# Patient Record
Sex: FEMALE | Race: WHITE | NOT HISPANIC OR LATINO | URBAN - METROPOLITAN AREA
[De-identification: names, ages, dates, MRNs, and addresses within clinical notes are randomized per-mention and may not be internally consistent; named-entity substitution may affect disease eponyms.]

---

## 2021-07-20 ENCOUNTER — OUTPATIENT (OUTPATIENT)
Dept: OUTPATIENT SERVICES | Facility: HOSPITAL | Age: 41
LOS: 1 days | End: 2021-07-20
Payer: COMMERCIAL

## 2021-07-20 DIAGNOSIS — Z11.52 ENCOUNTER FOR SCREENING FOR COVID-19: ICD-10-CM

## 2021-07-20 LAB — SARS-COV-2 RNA SPEC QL NAA+PROBE: SIGNIFICANT CHANGE UP

## 2021-07-20 PROCEDURE — U0003: CPT

## 2021-07-20 PROCEDURE — C9803: CPT

## 2021-07-20 PROCEDURE — U0005: CPT

## 2021-07-22 ENCOUNTER — INPATIENT (INPATIENT)
Facility: HOSPITAL | Age: 41
LOS: 0 days | Discharge: HOME | End: 2021-07-23
Attending: STUDENT IN AN ORGANIZED HEALTH CARE EDUCATION/TRAINING PROGRAM | Admitting: STUDENT IN AN ORGANIZED HEALTH CARE EDUCATION/TRAINING PROGRAM
Payer: COMMERCIAL

## 2021-07-22 VITALS
TEMPERATURE: 98 F | HEART RATE: 90 BPM | RESPIRATION RATE: 16 BRPM | DIASTOLIC BLOOD PRESSURE: 62 MMHG | SYSTOLIC BLOOD PRESSURE: 110 MMHG

## 2021-07-22 DIAGNOSIS — O09.523 SUPERVISION OF ELDERLY MULTIGRAVIDA, THIRD TRIMESTER: ICD-10-CM

## 2021-07-22 DIAGNOSIS — O40.3XX0 POLYHYDRAMNIOS, THIRD TRIMESTER, NOT APPLICABLE OR UNSPECIFIED: ICD-10-CM

## 2021-07-22 LAB
BASOPHILS # BLD AUTO: 0.06 K/UL — SIGNIFICANT CHANGE UP (ref 0–0.2)
BASOPHILS NFR BLD AUTO: 0.6 % — SIGNIFICANT CHANGE UP (ref 0–2)
BLD GP AB SCN SERPL QL: NEGATIVE — SIGNIFICANT CHANGE UP
COVID-19 SPIKE DOMAIN AB INTERP: POSITIVE
COVID-19 SPIKE DOMAIN ANTIBODY RESULT: >250 U/ML — HIGH
EOSINOPHIL # BLD AUTO: 0.11 K/UL — SIGNIFICANT CHANGE UP (ref 0–0.5)
EOSINOPHIL NFR BLD AUTO: 1 % — SIGNIFICANT CHANGE UP (ref 0–6)
HCT VFR BLD CALC: 40.6 % — SIGNIFICANT CHANGE UP (ref 34.5–45)
HGB BLD-MCNC: 13.6 G/DL — SIGNIFICANT CHANGE UP (ref 11.5–15.5)
IMM GRANULOCYTES NFR BLD AUTO: 1.9 % — HIGH (ref 0–1.5)
LYMPHOCYTES # BLD AUTO: 1.91 K/UL — SIGNIFICANT CHANGE UP (ref 1–3.3)
LYMPHOCYTES # BLD AUTO: 17.7 % — SIGNIFICANT CHANGE UP (ref 13–44)
MCHC RBC-ENTMCNC: 30.8 PG — SIGNIFICANT CHANGE UP (ref 27–34)
MCHC RBC-ENTMCNC: 33.5 GM/DL — SIGNIFICANT CHANGE UP (ref 32–36)
MCV RBC AUTO: 92.1 FL — SIGNIFICANT CHANGE UP (ref 80–100)
MONOCYTES # BLD AUTO: 0.6 K/UL — SIGNIFICANT CHANGE UP (ref 0–0.9)
MONOCYTES NFR BLD AUTO: 5.6 % — SIGNIFICANT CHANGE UP (ref 2–14)
NEUTROPHILS # BLD AUTO: 7.91 K/UL — HIGH (ref 1.8–7.4)
NEUTROPHILS NFR BLD AUTO: 73.2 % — SIGNIFICANT CHANGE UP (ref 43–77)
NRBC # BLD: 0 /100 WBCS — SIGNIFICANT CHANGE UP (ref 0–0)
PLATELET # BLD AUTO: 191 K/UL — SIGNIFICANT CHANGE UP (ref 150–400)
RBC # BLD: 4.41 M/UL — SIGNIFICANT CHANGE UP (ref 3.8–5.2)
RBC # FLD: 13.6 % — SIGNIFICANT CHANGE UP (ref 10.3–14.5)
RH IG SCN BLD-IMP: POSITIVE — SIGNIFICANT CHANGE UP
SARS-COV-2 IGG+IGM SERPL QL IA: >250 U/ML — HIGH
SARS-COV-2 IGG+IGM SERPL QL IA: POSITIVE
T PALLIDUM AB TITR SER: NEGATIVE — SIGNIFICANT CHANGE UP
WBC # BLD: 10.79 K/UL — HIGH (ref 3.8–10.5)
WBC # FLD AUTO: 10.79 K/UL — HIGH (ref 3.8–10.5)

## 2021-07-22 RX ORDER — OXYTOCIN 10 UNIT/ML
4 VIAL (ML) INJECTION
Qty: 30 | Refills: 0 | Status: DISCONTINUED | OUTPATIENT
Start: 2021-07-22 | End: 2021-07-22

## 2021-07-22 RX ORDER — PRAMOXINE HYDROCHLORIDE 150 MG/15G
1 AEROSOL, FOAM RECTAL EVERY 4 HOURS
Refills: 0 | Status: DISCONTINUED | OUTPATIENT
Start: 2021-07-22 | End: 2021-07-23

## 2021-07-22 RX ORDER — IBUPROFEN 200 MG
600 TABLET ORAL EVERY 6 HOURS
Refills: 0 | Status: COMPLETED | OUTPATIENT
Start: 2021-07-22 | End: 2022-06-20

## 2021-07-22 RX ORDER — OXYCODONE HYDROCHLORIDE 5 MG/1
5 TABLET ORAL
Refills: 0 | Status: DISCONTINUED | OUTPATIENT
Start: 2021-07-22 | End: 2021-07-23

## 2021-07-22 RX ORDER — DIPHENHYDRAMINE HCL 50 MG
25 CAPSULE ORAL EVERY 6 HOURS
Refills: 0 | Status: DISCONTINUED | OUTPATIENT
Start: 2021-07-22 | End: 2021-07-23

## 2021-07-22 RX ORDER — LANOLIN
1 OINTMENT (GRAM) TOPICAL EVERY 6 HOURS
Refills: 0 | Status: DISCONTINUED | OUTPATIENT
Start: 2021-07-22 | End: 2021-07-23

## 2021-07-22 RX ORDER — IBUPROFEN 200 MG
600 TABLET ORAL EVERY 6 HOURS
Refills: 0 | Status: DISCONTINUED | OUTPATIENT
Start: 2021-07-22 | End: 2021-07-23

## 2021-07-22 RX ORDER — SODIUM CHLORIDE 9 MG/ML
1000 INJECTION, SOLUTION INTRAVENOUS
Refills: 0 | Status: DISCONTINUED | OUTPATIENT
Start: 2021-07-22 | End: 2021-07-22

## 2021-07-22 RX ORDER — TETANUS TOXOID, REDUCED DIPHTHERIA TOXOID AND ACELLULAR PERTUSSIS VACCINE, ADSORBED 5; 2.5; 8; 8; 2.5 [IU]/.5ML; [IU]/.5ML; UG/.5ML; UG/.5ML; UG/.5ML
0.5 SUSPENSION INTRAMUSCULAR ONCE
Refills: 0 | Status: DISCONTINUED | OUTPATIENT
Start: 2021-07-22 | End: 2021-07-23

## 2021-07-22 RX ORDER — OXYCODONE HYDROCHLORIDE 5 MG/1
5 TABLET ORAL ONCE
Refills: 0 | Status: DISCONTINUED | OUTPATIENT
Start: 2021-07-22 | End: 2021-07-23

## 2021-07-22 RX ORDER — SODIUM CHLORIDE 9 MG/ML
3 INJECTION INTRAMUSCULAR; INTRAVENOUS; SUBCUTANEOUS EVERY 8 HOURS
Refills: 0 | Status: DISCONTINUED | OUTPATIENT
Start: 2021-07-22 | End: 2021-07-23

## 2021-07-22 RX ORDER — HYDROCORTISONE 1 %
1 OINTMENT (GRAM) TOPICAL EVERY 6 HOURS
Refills: 0 | Status: DISCONTINUED | OUTPATIENT
Start: 2021-07-22 | End: 2021-07-23

## 2021-07-22 RX ORDER — SIMETHICONE 80 MG/1
80 TABLET, CHEWABLE ORAL EVERY 4 HOURS
Refills: 0 | Status: DISCONTINUED | OUTPATIENT
Start: 2021-07-22 | End: 2021-07-23

## 2021-07-22 RX ORDER — CITRIC ACID/SODIUM CITRATE 300-500 MG
15 SOLUTION, ORAL ORAL EVERY 6 HOURS
Refills: 0 | Status: DISCONTINUED | OUTPATIENT
Start: 2021-07-22 | End: 2021-07-22

## 2021-07-22 RX ORDER — ACETAMINOPHEN 500 MG
975 TABLET ORAL
Refills: 0 | Status: DISCONTINUED | OUTPATIENT
Start: 2021-07-22 | End: 2021-07-23

## 2021-07-22 RX ORDER — DIBUCAINE 1 %
1 OINTMENT (GRAM) RECTAL EVERY 6 HOURS
Refills: 0 | Status: DISCONTINUED | OUTPATIENT
Start: 2021-07-22 | End: 2021-07-23

## 2021-07-22 RX ORDER — OXYTOCIN 10 UNIT/ML
VIAL (ML) INJECTION
Qty: 30 | Refills: 0 | Status: DISCONTINUED | OUTPATIENT
Start: 2021-07-22 | End: 2021-07-22

## 2021-07-22 RX ORDER — BENZOCAINE 10 %
1 GEL (GRAM) MUCOUS MEMBRANE EVERY 6 HOURS
Refills: 0 | Status: DISCONTINUED | OUTPATIENT
Start: 2021-07-22 | End: 2021-07-23

## 2021-07-22 RX ORDER — AER TRAVELER 0.5 G/1
1 SOLUTION RECTAL; TOPICAL EVERY 4 HOURS
Refills: 0 | Status: DISCONTINUED | OUTPATIENT
Start: 2021-07-22 | End: 2021-07-23

## 2021-07-22 RX ORDER — OXYTOCIN 10 UNIT/ML
333.33 VIAL (ML) INJECTION
Qty: 20 | Refills: 0 | Status: COMPLETED | OUTPATIENT
Start: 2021-07-22 | End: 2021-07-22

## 2021-07-22 RX ORDER — MAGNESIUM HYDROXIDE 400 MG/1
30 TABLET, CHEWABLE ORAL
Refills: 0 | Status: DISCONTINUED | OUTPATIENT
Start: 2021-07-22 | End: 2021-07-23

## 2021-07-22 RX ORDER — OXYTOCIN 10 UNIT/ML
333.33 VIAL (ML) INJECTION
Qty: 20 | Refills: 0 | Status: DISCONTINUED | OUTPATIENT
Start: 2021-07-22 | End: 2021-07-23

## 2021-07-22 RX ORDER — KETOROLAC TROMETHAMINE 30 MG/ML
30 SYRINGE (ML) INJECTION ONCE
Refills: 0 | Status: DISCONTINUED | OUTPATIENT
Start: 2021-07-22 | End: 2021-07-22

## 2021-07-22 RX ADMIN — SODIUM CHLORIDE 125 MILLILITER(S): 9 INJECTION, SOLUTION INTRAVENOUS at 06:12

## 2021-07-22 RX ADMIN — Medication 1000 MILLIUNIT(S)/MIN: at 17:45

## 2021-07-22 RX ADMIN — Medication 1000 MILLIUNIT(S)/MIN: at 14:10

## 2021-07-22 RX ADMIN — Medication 600 MILLIGRAM(S): at 21:18

## 2021-07-22 RX ADMIN — Medication 15 MILLILITER(S): at 07:23

## 2021-07-22 RX ADMIN — Medication 4 MILLIUNIT(S)/MIN: at 07:43

## 2021-07-22 RX ADMIN — Medication 30 MILLIGRAM(S): at 15:12

## 2021-07-22 NOTE — OB PROVIDER LABOR PROGRESS NOTE - ASSESSMENT
IOL for AMA, poly.  -c/w pit for augmentation  -efm/toco  -epi for pain control  -peanut ball francisca Verdugo MD

## 2021-07-22 NOTE — PRE-ANESTHESIA EVALUATION ADULT - NSANTHOSAYNRD_GEN_A_CORE
No. LANEY screening performed.  STOP BANG Legend: 0-2 = LOW Risk; 3-4 = INTERMEDIATE Risk; 5-8 = HIGH Risk

## 2021-07-22 NOTE — OB RN DELIVERY SUMMARY - NS_SEPSISRSKCALC_OBGYN_ALL_OB_FT
EOS calculated successfully. EOS Risk Factor: 0.5/1000 live births ([facility] national incidence); GA=39w3d; Temp=98.24; ROM=3.833; GBS='Negative'; Antibiotics='No antibiotics or any antibiotics < 2 hrs prior to birth'

## 2021-07-22 NOTE — OB PROVIDER DELIVERY SUMMARY - NSPROVIDERDELIVERYNOTE_OBGYN_ALL_OB_FT
of liveborn female infant in ALANA position. Head, shoulders, body delivered easily. Terminal mec. Delayed cord clamping performed. Cord clamped and cut. Private cord blood collection. Placenta delivered spontaneously and intact. Vaginal exam revealed 2nd degree perineal laceration, repaired with 2.0 and 3.0 vicryl rapide. Excellent hemostasis noted. Count correct x 2.

## 2021-07-22 NOTE — OB PROVIDER DELIVERY SUMMARY - NSSELHIDDEN_OBGYN_ALL_OB_FT
[NS_DeliveryAttending1_OBGYN_ALL_OB_FT:[id number]=],[NS_DeliveryRN_OBGYN_ALL_OB_FT:[id number]=]

## 2021-07-22 NOTE — PRE-ANESTHESIA EVALUATION ADULT - ANESTHESIA, PREVIOUS REACTION, PROFILE
Please check with pharmacy to see what the cost of adderall XR 10 mg daily would be.  Electronically signed by Vikki Toro M.D.    none

## 2021-07-22 NOTE — OB RN DELIVERY SUMMARY - NSSELHIDDEN_OBGYN_ALL_OB_FT
[NS_DeliveryAttending1_OBGYN_ALL_OB_FT:[id number]=],[NS_DeliveryRN_OBGYN_ALL_OB_FT:[id number]=]

## 2021-07-22 NOTE — OB PROVIDER H&P - HISTORY OF PRESENT ILLNESS
40y  @39wks and 3 days gestation presenting for scheduled IOL for polyhydramnios. Patient admits to irregular cramping. She denies LOF or VB. PNC c/b polyhydramnios (pt unsure of exact value) and AMA. +FM. GBS -. EFW 8#13 done by ultrasound yesterday.    POBHx: 2016-, FT, 6#3  PGYNHx: Denies fibroids, ovarian cysts, abnormal pap smears, STD's  PMHx: Denies  Medications: PNV  Allergies: NKDA  PSHx: Denies  Social Hx: Denies etoh/tobacco/drug use. Denies anxiety/depression/pp depression    Vital Signs Last 24 Hrs  T(C): 36.8 (2021 05:01), Max: 36.8 (2021 04:52)  T(F): 98.2 (2021 05:01), Max: 98.24 (2021 04:52)  HR: 97 (2021 05:23) (90 - 97)  BP: 110/62 (2021 05:01) (110/62 - 110/62)  BP(mean): --  RR: 16 (2021 05:01) (16 - 16)  SpO2: 97% (2021 05:23) (96% - 97%)    General: NAD, A&Ox3  CV: RRR  Lungs: CTA b/l  Abdomen: Soft, NT, gravid    VE: 3.5/70/-3  Bedside sono: Vertex presentation  EFM: 130/moderate variability/+accels/no decels  [place]: No ctx

## 2021-07-22 NOTE — OB PROVIDER H&P - NSHPPHYSICALEXAM_GEN_ALL_CORE
Vital Signs Last 24 Hrs  T(C): 36.8 (22 Jul 2021 05:01), Max: 36.8 (22 Jul 2021 04:52)  T(F): 98.2 (22 Jul 2021 05:01), Max: 98.24 (22 Jul 2021 04:52)  HR: 97 (22 Jul 2021 05:23) (90 - 97)  BP: 110/62 (22 Jul 2021 05:01) (110/62 - 110/62)  BP(mean): --  RR: 16 (22 Jul 2021 05:01) (16 - 16)  SpO2: 97% (22 Jul 2021 05:23) (96% - 97%)    General: NAD, A&Ox3  CV: RRR  Lungs: CTA b/l  Abdomen: Soft, NT, gravid

## 2021-07-22 NOTE — OB PROVIDER H&P - ASSESSMENT
A/P:  40y  @39wks and 3 days gestation presenting for scheduled IOL for polyhydramnios. +FM. GBS -. EFW 4000g  -Admit to L&D  -Routine labs  -EFM/[place]  -NPO, IVF, Bicitra  -IOL with vaginal cytotec  -Anesthesia consult  -Anticipate   Plan per induction schedule  Saba Leach PA-C

## 2021-07-23 ENCOUNTER — TRANSCRIPTION ENCOUNTER (OUTPATIENT)
Age: 41
End: 2021-07-23

## 2021-07-23 VITALS
DIASTOLIC BLOOD PRESSURE: 72 MMHG | SYSTOLIC BLOOD PRESSURE: 120 MMHG | OXYGEN SATURATION: 96 % | RESPIRATION RATE: 18 BRPM | TEMPERATURE: 98 F | HEART RATE: 87 BPM

## 2021-07-23 PROCEDURE — 86780 TREPONEMA PALLIDUM: CPT

## 2021-07-23 PROCEDURE — 86769 SARS-COV-2 COVID-19 ANTIBODY: CPT

## 2021-07-23 PROCEDURE — 59050 FETAL MONITOR W/REPORT: CPT

## 2021-07-23 PROCEDURE — 86900 BLOOD TYPING SEROLOGIC ABO: CPT

## 2021-07-23 PROCEDURE — 86901 BLOOD TYPING SEROLOGIC RH(D): CPT

## 2021-07-23 PROCEDURE — 85025 COMPLETE CBC W/AUTO DIFF WBC: CPT

## 2021-07-23 PROCEDURE — 59025 FETAL NON-STRESS TEST: CPT

## 2021-07-23 PROCEDURE — 86850 RBC ANTIBODY SCREEN: CPT

## 2021-07-23 RX ORDER — ACETAMINOPHEN 500 MG
3 TABLET ORAL
Qty: 0 | Refills: 0 | DISCHARGE
Start: 2021-07-23

## 2021-07-23 RX ORDER — IBUPROFEN 200 MG
1 TABLET ORAL
Qty: 0 | Refills: 0 | DISCHARGE
Start: 2021-07-23

## 2021-07-23 RX ADMIN — Medication 600 MILLIGRAM(S): at 14:40

## 2021-07-23 RX ADMIN — Medication 975 MILLIGRAM(S): at 11:53

## 2021-07-23 RX ADMIN — Medication 975 MILLIGRAM(S): at 12:25

## 2021-07-23 RX ADMIN — Medication 975 MILLIGRAM(S): at 00:10

## 2021-07-23 RX ADMIN — Medication 600 MILLIGRAM(S): at 02:41

## 2021-07-23 RX ADMIN — Medication 600 MILLIGRAM(S): at 15:03

## 2021-07-23 RX ADMIN — Medication 600 MILLIGRAM(S): at 09:12

## 2021-07-23 RX ADMIN — Medication 1 TABLET(S): at 11:53

## 2021-07-23 RX ADMIN — Medication 600 MILLIGRAM(S): at 09:45

## 2021-07-23 RX ADMIN — Medication 975 MILLIGRAM(S): at 05:56

## 2021-07-23 NOTE — DISCHARGE NOTE OB - PATIENT PORTAL LINK FT
You can access the FollowMyHealth Patient Portal offered by [facility] by registering at the following website: [email or web address]. By joining [employer]’s FollowMyHealth portal, you will also be able to view your health information using other applications (apps) compatible with our system.

## 2021-07-23 NOTE — DISCHARGE NOTE OB - CARE PLAN
Principal Discharge DX:	Normal spontaneous vaginal delivery  Goal:	Recovery  Assessment and plan of treatment:	Make a follow-up appointment with your doctor in SIX WEEKS for a postpartum appointment. No heavy lifting or strenuous activity for six weeks. Nothing in the vagina (such as tampons, douches, intercourse or tub baths) until you see your doctor. You can take 1000mg of Tylenol and/or 600mg of Motrin every 6 hours for pain. Call your doctor with any signs and symptoms of infection such as fever, chills, nausea or vomiting; if you're unable to tolerate food, have an increase in vaginal bleeding, or have difficulty urinating; or if you have pain that is not relieved by medication. Notify your doctor with any other concerns including feeling depressed or "down".

## 2021-07-23 NOTE — PROGRESS NOTE ADULT - SUBJECTIVE AND OBJECTIVE BOX
OB Progress Note:  PPD#1    S: 39yo  now  PPD#1 s/p . Patient feels well. Pain is well controlled. She is tolerating a regular diet and passing flatus. She is voiding spontaneously, and ambulating without difficulty. Denies CP/SOB. Denies lightheadedness/dizziness. Denies N/V.    O:  Vitals:  Vital Signs Last 24 Hrs  T(C): 36.8 (2021 05:23), Max: 37.2 (2021 18:00)  T(F): 98.2 (2021 05:23), Max: 99 (2021 18:00)  HR: 84 (2021 05:23) (81 - 120)  BP: 92/60 (2021 05:23) (63/28 - 135/69)  BP(mean): --  RR: 18 (2021 05:23) (18 - 20)  SpO2: 96% (2021 05:23) (85% - 100%)    MEDICATIONS  (STANDING):  acetaminophen   Tablet .. 975 milliGRAM(s) Oral <User Schedule>  diphtheria/tetanus/pertussis (acellular) Vaccine (ADAcel) 0.5 milliLiter(s) IntraMuscular once  ibuprofen  Tablet. 600 milliGRAM(s) Oral every 6 hours  oxytocin Infusion 333.333 milliUNIT(s)/Min (1000 mL/Hr) IV Continuous <Continuous>  prenatal multivitamin 1 Tablet(s) Oral daily  sodium chloride 0.9% lock flush 3 milliLiter(s) IV Push every 8 hours      Labs:  Blood type: A Positive  Rubella IgG: RPR: Negative                          13.6   10.79<H> >-----------< 191    ( 22 @ 06:18 )             40.6                  Physical Exam:  General: NAD  Abdomen: soft, non-tender, non-distended, fundus firm  Vaginal: Lochia wnl  Extremities: No erythema/edema

## 2021-07-23 NOTE — DISCHARGE NOTE OB - MATERIALS PROVIDED
[facility] [place] Screening Program/Breastfeeding Log/Breastfeeding Mother’s Support Group Information/Guide to Postpartum Care/[facility] Hearing Screen Program/Breastfeeding Guide and Packet/Birth Certificate Instructions

## 2021-07-23 NOTE — DISCHARGE NOTE OB - WEAR SUPPORTIVE BRA
Emergency Department  [address] [place] [address]  Phone:  [phone]  Fax:  [phone]                                    Phong Atkins   MRN: [id number]    Department:   Emergency Department   Date of Visit:  7/13/2019           After Visit Summary Signature Page    I have received my discharge instructions, and my questions have been answered. I have discussed any challenges I see with this plan with the nurse or doctor.    ..........................................................................................................................................  Patient/Patient Representative Signature      ..........................................................................................................................................  Patient Representative Print Name and Relationship to Patient    ..................................................               ................................................  Date                                   Time    ..........................................................................................................................................  Reviewed by Signature/Title    ...................................................              ..............................................  Date                                               Time          22EPIC Rev 08/18        Statement Selected

## 2021-07-23 NOTE — PROGRESS NOTE ADULT - ATTENDING COMMENTS
A/P: 41yo  PPD#1 s/p .   Patient is doing well postpartum.     #Pp care    - Pain well controlled, continue current pain regimen  - Increase ambulation, SCDs when not ambulating  - Continue regular diet  patient seen by me doing well  bonding with baby  for discharge tomorrow  eddier

## 2021-07-23 NOTE — DISCHARGE NOTE OB - MEDICATION SUMMARY - MEDICATIONS TO TAKE
I will START or STAY ON the medications listed below when I get home from the hospital:    acetaminophen 325 mg oral tablet  -- 3 tab(s) by mouth   -- Indication: For Pain control    ibuprofen 600 mg oral tablet  -- 1 tab(s) by mouth every 6 hours  -- Indication: For Pain control

## 2021-07-23 NOTE — DISCHARGE NOTE OB - CARE PROVIDER_API CALL
Jessica Wharton  RESIDENCY - OBSTETRIC-GYN  [address] [place] [address]  Phone: [phone]  Fax: [phone]  Follow Up Time:

## 2021-07-23 NOTE — PROGRESS NOTE ADULT - ASSESSMENT
A/P: 41yo  PPD#1 s/p .   Patient is doing well postpartum.     #Pp care    - Pain well controlled, continue current pain regimen  - Increase ambulation, SCDs when not ambulating  - Continue regular diet    Gabriella Gómez, PGY1   A/P: 39yo  PPD#1 s/p .   Patient is doing well postpartum.     #Pp care    - Pain well controlled, continue current pain regimen  - Increase ambulation, SCDs when not ambulating  - Continue regular diet    Gabriella Gómez, PGY1  patient seen by me doing well  bonding with baby  for discharge tomorrow  eddier

## 2022-08-24 NOTE — OB PROVIDER DELIVERY SUMMARY - NSCOUNTCORRECT_OBGYN_ALL_OB
Medicare Wellness Visit patient outreach outcome:  Attempted outreach and message left     Vladimir [facility] [place] Health Assistant   [phone]   Laps, needles and instruments count was reported as correct.

## 2022-09-02 ENCOUNTER — EMERGENCY (EMERGENCY)
Facility: HOSPITAL | Age: 42
LOS: 1 days | Discharge: HOME | End: 2022-09-02
Attending: EMERGENCY MEDICINE | Admitting: EMERGENCY MEDICINE
Payer: COMMERCIAL

## 2022-09-02 VITALS
HEIGHT: 67 IN | TEMPERATURE: 98 F | HEART RATE: 73 BPM | OXYGEN SATURATION: 98 % | RESPIRATION RATE: 17 BRPM | SYSTOLIC BLOOD PRESSURE: 122 MMHG | WEIGHT: 187.39 LBS | DIASTOLIC BLOOD PRESSURE: 77 MMHG

## 2022-09-02 DIAGNOSIS — H92.03 OTALGIA, BILATERAL: ICD-10-CM

## 2022-09-02 PROCEDURE — 99283 EMERGENCY DEPT VISIT LOW MDM: CPT

## 2022-09-02 PROCEDURE — 99284 EMERGENCY DEPT VISIT MOD MDM: CPT

## 2022-09-02 RX ORDER — AMOXICILLIN 250 MG/5ML
500 SUSPENSION, RECONSTITUTED, ORAL (ML) ORAL ONCE
Refills: 0 | Status: COMPLETED | OUTPATIENT
Start: 2022-09-02 | End: 2022-09-02

## 2022-09-02 RX ORDER — AMOXICILLIN 250 MG/5ML
1 SUSPENSION, RECONSTITUTED, ORAL (ML) ORAL
Qty: 30 | Refills: 0
Start: 2022-09-02 | End: 2022-09-11

## 2022-09-02 RX ORDER — CIPROFLOXACIN 6 MG/ML
1 SUSPENSION INTRATYMPANIC
Qty: 1 | Refills: 0
Start: 2022-09-02 | End: 2022-09-08

## 2022-09-02 RX ORDER — CIPROFLOXACIN AND DEXAMETHASONE 3; 1 MG/ML; MG/ML
4 SUSPENSION/ DROPS AURICULAR (OTIC)
Qty: 1 | Refills: 0
Start: 2022-09-02 | End: 2022-09-08

## 2022-09-02 RX ADMIN — Medication 500 MILLIGRAM(S): at 02:51

## 2022-09-02 NOTE — ED ADULT NURSE NOTE - OBJECTIVE STATEMENT
42yr old female walked into ED c/o b/l ear pain since yesterday morning; pt states she thought it was swimmers ear so she used her sons ear drops which gave some relief; pt woke up this morning and felt the pain again

## 2022-09-02 NOTE — ED PROVIDER NOTE - NSFOLLOWUPINSTRUCTIONS_ED_ALL_ED_FT
Ear Infection    WHAT YOU NEED TO KNOW:    What do I need to know about an ear infection? An ear infection is also called otitis media. Blocked or swollen eustachian tubes can cause an infection. Eustachian tubes connect the middle ear to the back of the nose and throat. They drain fluid from the middle ear. You may have a buildup of fluid in your ear. Germs build up in the fluid and infection develops.  Ear Anatomy         What are the signs and symptoms of an ear infection?   •Ear pain      •Fever or a headache      •Trouble hearing      •Ringing or buzzing in your ear      •Plugged ear or an ear that feels full      •Dizziness      •Nausea or vomiting      How is an ear infection diagnosed? Your healthcare provider will examine your ears, head, neck, and mouth. He or she will also ask you to describe your symptoms. You may also need the following:  •Audiometry is a test used to check for hearing loss. Sounds are played at different volumes to check how much you can hear.      •Tympanometry is a test used to check pressure changes in your inner ear.      How is an ear infection treated?   •Acetaminophen decreases pain and fever. It is available without a doctor's order. Ask how much to take and how often to take it. Follow directions. Read the labels of all other medicines you are using to see if they also contain acetaminophen, or ask your doctor or pharmacist. Acetaminophen can cause liver damage if not taken correctly.      •NSAIDs, such as ibuprofen, help decrease swelling, pain, and fever. This medicine is available with or without a doctor's order. NSAIDs can cause stomach bleeding or kidney problems in certain people. If you take blood thinner medicine, always ask your healthcare provider if NSAIDs are safe for you. Always read the medicine label and follow directions.      •Ear drops may contain medicine to decrease pain and inflammation.      •Antibiotics help treat a bacterial infection.      How can I manage my symptoms?   •Apply heat on your ear for 15 to 20 minutes, 3 to 4 times a day or as directed. You can apply heat with an electric heating pad, hot water bottle, or warm compress. Always put a cloth between your skin and the heat pack to prevent burns. Heat helps decrease pain.      •Apply ice on your ear for 15 to 20 minutes, 3 to 4 times a day for 2 days or as directed. Use an ice pack, or put crushed ice in a plastic bag. Cover it with a towel before you apply it to your ear. Ice decreases swelling and pain.      How can I help prevent an ear infection?   •Wash your hands often to help prevent the spread of germs. Ask everyone in your house to wash their hands with soap and water. Ask them to wash after they use the bathroom or change a diaper. Remind them to wash before they prepare or eat food.  Handwashing           •Stay away from people who are ill. Some germs spread easily and quickly through contact.      When should I seek immediate care?   •You have clear fluid coming from your ear.      •You have a stiff neck, headache, and a fever.      When should I call my doctor?   •You see blood or pus draining from your ear.      •Your ear pain gets worse or does not go away, even after treatment.      •The outside of your ear is red or swollen.      •You are vomiting or have diarrhea.      •You have questions or concerns about your condition or care.      CARE AGREEMENT:    You have the right to help plan your care. Learn about your health condition and how it may be treated. Discuss treatment options with your healthcare providers to decide what care you want to receive. You always have the right to refuse treatment.

## 2022-09-02 NOTE — ED PROVIDER NOTE - OBJECTIVE STATEMENT
Patient complains of bilateral ear pain since this morning.  Pain is equal in both the ears.  No fever.  No drainage from her ears.  Patient swims a lot in pools so used some of her sons drops assuming it was swimmer's ear.  Pain eased some but patient awoke this morning with severe pain.  No other complaints

## 2022-09-02 NOTE — ED ADULT NURSE NOTE - CHIEF COMPLAINT QUOTE
My ears were killing me all day but I thought it was swimmers ear, I used drops which helped for some time. I woke up in a lot of pain in both ears

## 2022-09-02 NOTE — ED PROVIDER NOTE - CARE PROVIDER_API CALL
Joaquin Dos Santos)  Otolaryngology  [address], [address]  [place] [address]  Phone: [phone]  Fax: [phone]  Follow Up Time:

## 2022-09-02 NOTE — ED PROVIDER NOTE - PATIENT PORTAL LINK FT
You can access the FollowMyHealth Patient Portal offered by [facility] by registering at the following website: [email or web address]. By joining [employer]’s FollowMyHealth portal, you will also be able to view your health information using other applications (apps) compatible with our system.

## 2022-09-02 NOTE — ED ADULT TRIAGE NOTE - CHIEF COMPLAINT QUOTE
How Severe Are Your Spot(S)?: mild What Is The Reason For Today's Visit?: Full Body Skin Examination What Is The Reason For Today's Visit? (Being Monitored For X): concerning skin lesions on an annual basis My ears were killing me all day but I thought it was swimmers ear, I used drops which helped for some time. I woke up in a lot of pain in both ears

## 2023-01-23 NOTE — OB RN PATIENT PROFILE - NS PRO DEPRESSION SCREENING Y/N1
Good nutrition is important when healing from an illness, injury, or surgery. Follow any nutrition recommendations given to you during your hospital stay. If you were given an oral nutrition supplement while in the hospital, continue to take this supplement at home. You can take it with meals, in-between meals, and/or before bedtime. These supplements can be purchased at most local grocery stores, pharmacies, and chain [employer]-stores. If you have any questions about your diet or nutrition, call the hospital and ask for the dietitian.       Carb controlled
no

## 2023-07-13 ENCOUNTER — RESULT REVIEW (OUTPATIENT)
Age: 43
End: 2023-07-13

## 2023-10-18 ENCOUNTER — OFFICE (OUTPATIENT)
Dept: URBAN - METROPOLITAN AREA CLINIC 109 | Facility: CLINIC | Age: 43
Setting detail: OPHTHALMOLOGY
End: 2023-10-18
Payer: COMMERCIAL

## 2023-10-18 DIAGNOSIS — H16.223: ICD-10-CM

## 2023-10-18 DIAGNOSIS — H52.7: ICD-10-CM

## 2023-10-18 PROCEDURE — 92015 DETERMINE REFRACTIVE STATE: CPT | Performed by: OPHTHALMOLOGY

## 2023-10-18 PROCEDURE — 92014 COMPRE OPH EXAM EST PT 1/>: CPT | Performed by: OPHTHALMOLOGY

## 2023-10-18 ASSESSMENT — REFRACTION_MANIFEST
OS_AXIS: 165
OD_SPHERE: -2.25
OD_SPHERE: -2.25
OD_ADD: +0.75
OS_VA1: 20/20
OD_AXIS: 176
OS_CYLINDER: -1.25
OS_SPHERE: -1.75
OS_SPHERE: -1.75
OD_AXIS: 5
OS_AXIS: 162
OS_ADD: +0.75
OD_CYLINDER: -0.75
OS_CYLINDER: -1.00
OD_VA1: 20/20
OD_CYLINDER: -0.75

## 2023-10-18 ASSESSMENT — KERATOMETRY
OS_K1POWER_DIOPTERS: 43.50
OD_AXISANGLE_DEGREES: 084
OD_K2POWER_DIOPTERS: 44.75
OD_K1POWER_DIOPTERS: 43.25
OS_K2POWER_DIOPTERS: 45.00
OS_AXISANGLE_DEGREES: 085

## 2023-10-18 ASSESSMENT — CONFRONTATIONAL VISUAL FIELD TEST (CVF)
OS_FINDINGS: FULL
OD_FINDINGS: FULL

## 2023-10-18 ASSESSMENT — REFRACTION_CURRENTRX
OS_ADD: +0.50
OD_CYLINDER: -0.75
OD_AXIS: 006
OD_OVR_VA: 20/
OS_AXIS: 175
OD_SPHERE: -2.00
OD_ADD: +0.50
OS_OVR_VA: 20/
OS_SPHERE: -1.75
OS_CYLINDER: -0.75

## 2023-10-18 ASSESSMENT — REFRACTION_AUTOREFRACTION
OS_AXIS: 169
OD_AXIS: 001
OD_CYLINDER: -1.00
OS_CYLINDER: -1.00
OD_SPHERE: -2.00
OS_SPHERE: -1.50

## 2023-10-18 ASSESSMENT — CORNEAL TRAUMA - ABRASION: OD_ABRASION: PRESENT

## 2023-10-18 ASSESSMENT — SUPERFICIAL PUNCTATE KERATITIS (SPK)
OS_SPK: T
OD_SPK: T

## 2023-10-18 ASSESSMENT — AXIALLENGTH_DERIVED
OS_AL: 24.1066
OD_AL: 24.4619
OD_AL: 24.4619
OS_AL: 24.2601
OS_AL: 24.2088
OD_AL: 24.4097

## 2023-10-18 ASSESSMENT — TONOMETRY
OS_IOP_MMHG: 15
OD_IOP_MMHG: 15

## 2023-10-18 ASSESSMENT — SPHEQUIV_DERIVED
OD_SPHEQUIV: -2.625
OD_SPHEQUIV: -2.625
OD_SPHEQUIV: -2.5
OS_SPHEQUIV: -2.25
OS_SPHEQUIV: -2.375
OS_SPHEQUIV: -2

## 2023-10-18 ASSESSMENT — VISUAL ACUITY
OD_BCVA: 20/20
OS_BCVA: 20/20

## 2024-07-18 NOTE — ED PROVIDER NOTE - CLINICAL SUMMARY MEDICAL DECISION MAKING FREE TEXT BOX
CONTACT INFORMATION:  The main office phone number is [phone]. For emergencies after hours and on weekends, this number will convert over to our answering service and the on call provider will answer. Please try to keep non emergent phone calls/ questions to office hours 9am-5pm Monday through Friday.      [employer]  As an alternative, you can sign up and use the Epic MyChart system for more direct and quicker access for non emergent questions/ problems.  [employer] allows you to send messages to your doctor, view your test results, renew your prescriptions, schedule appointments, and more. To sign up, go to [facility] and click on the Sign Up Now link in the New User? box. Enter your [employer] Activation Code exactly as it appears below along with the last four digits of your Social Security Number and your Date of Birth () to complete the sign-up process. If you do not sign up before the expiration date, you must request a new code.     [employer] Activation Code: Activation code not generated  Current [employer] Status: Active     If you have questions, you can email [email or web address] or call [phone] to talk to our [employer] staff. Remember, [employer] is NOT to be used for urgent needs. For medical emergencies, dial 911.     IF YOU SMOKE OR USE TOBACCO PLEASE READ THE FOLLOWING:  Why is smoking bad for me?  Smoking increases the risk of heart disease, lung disease, vascular disease, stroke, and cancer. If you smoke, STOP!        IF YOU SMOKE OR USE TOBACCO PLEASE READ THE FOLLOWING:  Why is smoking bad for me?  Smoking increases the risk of heart disease, lung disease, vascular disease, stroke, and cancer. If you smoke, STOP!     For more information:  Quit Now [place]  -QUIT-NOW  [email or web address]/en-[email or web address]    Patient with bilateral canal erythema and redness of pars flaccida on left. Will start oral abx and refer to ENT

## 2024-12-07 ENCOUNTER — OFFICE (OUTPATIENT)
Dept: URBAN - METROPOLITAN AREA CLINIC 63 | Facility: CLINIC | Age: 44
Setting detail: OPHTHALMOLOGY
End: 2024-12-07
Payer: COMMERCIAL

## 2024-12-07 DIAGNOSIS — H50.60: ICD-10-CM

## 2024-12-07 DIAGNOSIS — H52.7: ICD-10-CM

## 2024-12-07 DIAGNOSIS — H52.4: ICD-10-CM

## 2024-12-07 DIAGNOSIS — H16.223: ICD-10-CM

## 2024-12-07 PROCEDURE — 92014 COMPRE OPH EXAM EST PT 1/>: CPT | Performed by: INTERNAL MEDICINE

## 2024-12-07 PROCEDURE — 92015 DETERMINE REFRACTIVE STATE: CPT | Performed by: INTERNAL MEDICINE

## 2024-12-07 ASSESSMENT — REFRACTION_CURRENTRX
OD_AXIS: 004
OD_OVR_VA: 20/
OD_SPHERE: -2.25
OD_CYLINDER: -0.75
OS_OVR_VA: 20/
OS_AXIS: 166
OS_CYLINDER: -1.50
OS_SPHERE: -1.75

## 2024-12-07 ASSESSMENT — REFRACTION_MANIFEST
OD_CYLINDER: -0.75
OD_AXIS: 176
OS_AXIS: 160
OS_VA1: 20/20
OS_SPHERE: -2.00
OD_VA1: 20/20
OS_CYLINDER: -1.00
OD_VA1: 20/20
OD_AXIS: 175
OS_SPHERE: -1.75
OS_AXIS: 165
OS_SPHERE: -1.75
OD_CYLINDER: -0.75
OD_SPHERE: -2.25
OD_ADD: +1.00
OS_AXIS: 162
OS_VA1: 20/20
OD_SPHERE: -2.75
OD_SPHERE: -2.25
OD_ADD: +0.75
OS_ADD: +1.00
OS_CYLINDER: -1.00
OD_AXIS: 5
OS_CYLINDER: -1.25
OU_VA: 20/20
OD_CYLINDER: -0.75
OS_ADD: +0.75

## 2024-12-07 ASSESSMENT — REFRACTION_AUTOREFRACTION
OD_SPHERE: -2.75
OS_CYLINDER: -1.00
OS_AXIS: 162
OD_AXIS: 175
OS_SPHERE: -2.00
OD_CYLINDER: -0.75

## 2024-12-07 ASSESSMENT — TONOMETRY
OD_IOP_MMHG: 16
OS_IOP_MMHG: 16

## 2024-12-07 ASSESSMENT — KERATOMETRY
OS_K1POWER_DIOPTERS: 43.50
OD_AXISANGLE_DEGREES: 084
OS_K2POWER_DIOPTERS: 45.00
OD_K2POWER_DIOPTERS: 44.75
OS_AXISANGLE_DEGREES: 085
OD_K1POWER_DIOPTERS: 43.25

## 2024-12-07 ASSESSMENT — CONFRONTATIONAL VISUAL FIELD TEST (CVF)
OS_FINDINGS: FULL
OD_FINDINGS: FULL

## 2024-12-07 ASSESSMENT — VISUAL ACUITY
OS_BCVA: 20/20
OD_BCVA: 20/20

## 2024-12-07 ASSESSMENT — SUPERFICIAL PUNCTATE KERATITIS (SPK)
OD_SPK: T
OS_SPK: T